# Patient Record
Sex: MALE | Race: WHITE | Employment: OTHER | URBAN - METROPOLITAN AREA
[De-identification: names, ages, dates, MRNs, and addresses within clinical notes are randomized per-mention and may not be internally consistent; named-entity substitution may affect disease eponyms.]

---

## 2017-04-03 ENCOUNTER — OFFICE VISIT (OUTPATIENT)
Dept: INTERNAL MEDICINE CLINIC | Facility: CLINIC | Age: 82
End: 2017-04-03

## 2017-04-03 VITALS
BODY MASS INDEX: 29.49 KG/M2 | HEIGHT: 70 IN | SYSTOLIC BLOOD PRESSURE: 100 MMHG | HEART RATE: 87 BPM | TEMPERATURE: 98 F | WEIGHT: 206 LBS | RESPIRATION RATE: 14 BRPM | DIASTOLIC BLOOD PRESSURE: 60 MMHG | OXYGEN SATURATION: 97 %

## 2017-04-03 DIAGNOSIS — Z86.79 HISTORY OF ATRIAL FIBRILLATION: ICD-10-CM

## 2017-04-03 DIAGNOSIS — H91.13 PRESBYCUSIS OF BOTH EARS: ICD-10-CM

## 2017-04-03 DIAGNOSIS — E03.9 HYPOTHYROIDISM, UNSPECIFIED TYPE: ICD-10-CM

## 2017-04-03 DIAGNOSIS — G30.1 LATE ONSET ALZHEIMER'S DISEASE WITH BEHAVIORAL DISTURBANCE (HCC): ICD-10-CM

## 2017-04-03 DIAGNOSIS — Z00.00 MEDICARE ANNUAL WELLNESS VISIT, SUBSEQUENT: Primary | ICD-10-CM

## 2017-04-03 DIAGNOSIS — F02.81 LATE ONSET ALZHEIMER'S DISEASE WITH BEHAVIORAL DISTURBANCE (HCC): ICD-10-CM

## 2017-04-03 DIAGNOSIS — Z86.73 HISTORY OF CVA (CEREBROVASCULAR ACCIDENT): ICD-10-CM

## 2017-04-03 PROCEDURE — 80053 COMPREHEN METABOLIC PANEL: CPT | Performed by: INTERNAL MEDICINE

## 2017-04-03 PROCEDURE — G0439 PPPS, SUBSEQ VISIT: HCPCS | Performed by: INTERNAL MEDICINE

## 2017-04-03 PROCEDURE — 84443 ASSAY THYROID STIM HORMONE: CPT | Performed by: INTERNAL MEDICINE

## 2017-04-03 PROCEDURE — 80061 LIPID PANEL: CPT | Performed by: INTERNAL MEDICINE

## 2017-04-03 PROCEDURE — 85027 COMPLETE CBC AUTOMATED: CPT | Performed by: INTERNAL MEDICINE

## 2017-04-03 NOTE — PROGRESS NOTES
HPI:    Patient ID: Josiah Silva is a 80year old male. HPIPt here for getting restablished as a new pt. Has hx of P afib and mild dm . Hx of small ischemic events. Lives at home cared for by adult son.   Has moderate dementia without agitation No    Type of Diet: Other    How does the patient maintain a good energy level?: Other         How would you describe your current health state?: Good    How do you maintain positive mental well-being?: Visiting Family    If you are a male age 38-65 or a f completed by patient and sent to HIM for scanning? Yes    Please go to \"Cognitive Assessment\" under Medicare Assessment section in Charting, test patient and document. .    Then, refresh your progress note to see your input here.   Cognitive Assessment well otherwise  SKIN: denies any unusual skin lesions  EYES: denies blurred vision or double vision  HEENT: denies nasal congestion, sinus pain or ST  LUNGS: denies shortness of breath with exertion  CARDIOVASCULAR: denies chest pain on exertion  GI: denie Panel [E]; Future  -     TSH W Reflex To Free T4 [E]; Future    Late onset Alzheimer's disease with behavioral disturbance    History of atrial fibrillation         The patient indicates understanding of these issues and agrees to the plan.   The patient is found for: CREATSERUM No flowsheet data found. BUN  Annually No results found for: BUN No flowsheet data found. Drug Serum Conc  Annually No results found for: DIGOXIN, DIG, VALP No flowsheet data found.     Diabetes      HgbA1C  Annually No results

## 2017-04-11 ENCOUNTER — TELEPHONE (OUTPATIENT)
Dept: INTERNAL MEDICINE CLINIC | Facility: CLINIC | Age: 82
End: 2017-04-11

## 2017-04-11 NOTE — TELEPHONE ENCOUNTER
Son requesting Skilled nursing evaluation for at home therapy   [address]  [address] [place] [address], Ander Mayur [facility]  [phone] fax [phone]

## 2017-04-26 ENCOUNTER — TELEPHONE (OUTPATIENT)
Dept: INTERNAL MEDICINE CLINIC | Facility: CLINIC | Age: 82
End: 2017-04-26

## 2017-04-26 NOTE — TELEPHONE ENCOUNTER
Yes, eye swollen but not painful red on the upper lid, Just started thyroid medication, She saw patient for the first time yesterday last night. Patient does pick at himself according to the patients son. Nurse says he looks good.  She instructed the patien

## 2017-05-16 ENCOUNTER — TELEPHONE (OUTPATIENT)
Dept: INTERNAL MEDICINE CLINIC | Facility: CLINIC | Age: 82
End: 2017-05-16

## 2017-05-16 NOTE — TELEPHONE ENCOUNTER
4 or 5 patches on arm he scratches at these no broken skin very icthy vit E ok but not effective sent triamcinolone . 1% per Dr Stoner Failing

## 2017-05-23 ENCOUNTER — MED REC SCAN ONLY (OUTPATIENT)
Dept: INTERNAL MEDICINE CLINIC | Facility: CLINIC | Age: 82
End: 2017-05-23

## 2017-07-11 ENCOUNTER — MED REC SCAN ONLY (OUTPATIENT)
Dept: INTERNAL MEDICINE CLINIC | Facility: CLINIC | Age: 82
End: 2017-07-11

## 2017-11-13 RX ORDER — LEVOTHYROXINE SODIUM 0.03 MG/1
TABLET ORAL
Qty: 30 TABLET | Refills: 0 | Status: SHIPPED | OUTPATIENT
Start: 2017-11-13 | End: 2018-01-05

## 2018-01-05 RX ORDER — LEVOTHYROXINE SODIUM 0.03 MG/1
TABLET ORAL
Qty: 90 TABLET | Refills: 0 | Status: SHIPPED | OUTPATIENT
Start: 2018-01-05 | End: 2018-04-05

## 2018-04-06 RX ORDER — LEVOTHYROXINE SODIUM 0.03 MG/1
TABLET ORAL
Qty: 90 TABLET | Refills: 0 | Status: SHIPPED | OUTPATIENT
Start: 2018-04-06 | End: 2018-07-04

## 2018-07-05 NOTE — TELEPHONE ENCOUNTER
Last OV 4/3/17. No future appt scheduled.        Thyroid Supplements Protocol Failed7/4 5:28 PM   TSH test in past 12 months    TSH value between 0.350 and 5.500 IU/ml    Appointment in past 12 or next 3 months

## 2018-07-07 RX ORDER — LEVOTHYROXINE SODIUM 0.03 MG/1
TABLET ORAL
Qty: 90 TABLET | Refills: 0 | Status: SHIPPED | OUTPATIENT
Start: 2018-07-07 | End: 2018-07-23

## 2018-07-10 ENCOUNTER — TELEPHONE (OUTPATIENT)
Dept: INTERNAL MEDICINE CLINIC | Facility: CLINIC | Age: 83
End: 2018-07-10

## 2018-07-23 NOTE — PROGRESS NOTES
HPI:    Patient ID: Tamir Abel is a 80year old male. Pt here to [facility].  care after a 4 year interval.  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 Is cared for at home by pts son Hoda Garvey.     Review of Systems Comp Metabolic Panel (14) [E]      CBC W Differential W Platelet [E]      TSH W Reflex To Free T4 [E]    Meds This Visit:  Signed Prescriptions Disp Refills    Levothyroxine Sodium 25 MCG Oral Tab 90 tablet 3      Sig: Take 1 tablet (25 mcg total) by mouth

## 2019-03-15 ENCOUNTER — TELEPHONE (OUTPATIENT)
Dept: INTERNAL MEDICINE CLINIC | Facility: CLINIC | Age: 84
End: 2019-03-15

## 2019-03-15 NOTE — TELEPHONE ENCOUNTER
Spoke with Pt's son who states that Pt is in bed and can not walk, so they wont be scheduling a visit.

## 2021-03-12 DIAGNOSIS — Z23 NEED FOR VACCINATION: ICD-10-CM

## (undated) NOTE — MR AVS SNAPSHOT
[facility] at [address] [place] [address] Road [address] [phone]               Thank you for choosing us for your health care visit with Blayne Brito MD.  We are glad to serve you and happy to provid History of CVA (cerebrovascular accident)    Medicare annual wellness visit, subsequent    -  Primary      Instructions and Information about Your Health     None      Allergies as of Apr 03, 2017     Ciprofloxacin     Contrast Dye [Gadolinium Derivatives the bedroom and bathroom. ? Get up slowly from lying down or sitting if you get dizzy. ? Keep a working flashlight near your bed. STAIRS:  ? Keep stairwells well lit with light switches at top and bottom. ? Install sturdy handrails on both sides. ?  [place] are inactive.      HOW TO GET STARTED: HOW TO STAY MOTIVATED:   Start activities slowly and build up over time Do what you like   Get your heart pumping – brisk walking, biking, swimming Even 10 minute increments are effective and add up over the week   2 ½

## (undated) NOTE — LETTER
08/22/18        Ventura Peters  [address]   [place] [address]      Dear Philomena Pandey,    Our records indicate that you have outstanding lab work and or testing that was ordered for you and has not yet been completed:          Janes Cortés